# Patient Record
Sex: FEMALE | Race: WHITE | NOT HISPANIC OR LATINO | Employment: PART TIME | URBAN - METROPOLITAN AREA
[De-identification: names, ages, dates, MRNs, and addresses within clinical notes are randomized per-mention and may not be internally consistent; named-entity substitution may affect disease eponyms.]

---

## 2020-08-11 ENCOUNTER — COMMUNICATION - HEALTHEAST (OUTPATIENT)
Dept: SCHEDULING | Facility: CLINIC | Age: 40
End: 2020-08-11

## 2020-08-13 ENCOUNTER — COMMUNICATION - HEALTHEAST (OUTPATIENT)
Dept: CARE COORDINATION | Facility: CLINIC | Age: 40
End: 2020-08-13

## 2020-08-14 ENCOUNTER — COMMUNICATION - HEALTHEAST (OUTPATIENT)
Dept: CARE COORDINATION | Facility: CLINIC | Age: 40
End: 2020-08-14

## 2020-08-15 ENCOUNTER — COMMUNICATION - HEALTHEAST (OUTPATIENT)
Dept: SCHEDULING | Facility: CLINIC | Age: 40
End: 2020-08-15

## 2021-05-25 ENCOUNTER — RECORDS - HEALTHEAST (OUTPATIENT)
Dept: ADMINISTRATIVE | Facility: CLINIC | Age: 41
End: 2021-05-25

## 2021-06-01 ENCOUNTER — RECORDS - HEALTHEAST (OUTPATIENT)
Dept: ADMINISTRATIVE | Facility: CLINIC | Age: 41
End: 2021-06-01

## 2021-06-02 ENCOUNTER — RECORDS - HEALTHEAST (OUTPATIENT)
Dept: ADMINISTRATIVE | Facility: CLINIC | Age: 41
End: 2021-06-02

## 2021-06-10 NOTE — TELEPHONE ENCOUNTER
An alert was received via  Care Loop from pt regarding a change in status     Call back to pt attempted at 825am       Left message for her to call back and ask to speak with a Triage RN           Daniel Duran rn

## 2021-06-10 NOTE — TELEPHONE ENCOUNTER
"An alert was received via  Care Loop from pt regarding a change in status      Spoke with pt       Sounds tired and voice sounds a bit weak - some light coughing heard -- overall \"about the same\" as when she was seen at ED this am - was started on ABX at that time and given care directions for home care as well         Directed to call back as needed - She is ok with cont follow up via care loop and has tele# to reach triage RNs as well           Daniel Duran rn          "

## 2021-06-10 NOTE — TELEPHONE ENCOUNTER
"[facility] for dyspnea and cough.  Pt states she was feeling \"ok, sx more manageable\" last night and this morning, but this afternoon she lay down to watch TV and started not feeling well again; her breathing felt \"wheezy and raspy,\" she checked her O2 sats and they were down to 90-91%, which hasn't happened before.  States her O2 sats went up to 94-95% after sitting up straight and have stayed there since.  States her chest is hurting again, and it's hard to take a deep breath as the day's gone on; coughing more.  Talking too long causes her to get short of breath, but it wasn't like that this morning.  States she had her 3 kids tested for COVID-19 today, and her sister will be keeping her kids at her house for the next few days so she can rest.  Her fever has been down, today 98.2, but she still has the bodyaches and chills, even when her temp is lower.      RN encouraged pt to be seen in the ER for worsening chest pain, shortness of breath, but prefers to self-monitor for now but will consider going to the ER if her O2 sats drop again or her CP or shortness of breath continue to worsen.  Pt stated main concern is she has no insurance.  RN informed pt of [facility] to help with financial assistance, if she needs to be seen in the ER.  Pt will think about it.    Reason for Disposition    MILD difficulty breathing (e.g., minimal/no SOB at rest, SOB with walking, pulse <100)    Chest pain or pressure    Additional Information    Negative: SEVERE difficulty breathing (e.g., struggling for each breath, speaks in single words)    Negative: Difficult to awaken or acting confused (e.g., disoriented, slurred speech)    Negative: Bluish (or gray) lips or face now    Negative: Shock suspected (e.g., cold/pale/clammy skin, too weak to stand, low BP, rapid pulse)    Negative: Sounds like a life-threatening emergency to the triager    Negative: [1] COVID-19 exposure AND [2] no symptoms    Negative: COVID-19 and " Breastfeeding, questions about    Negative: [1] Adult with possible COVID-19 symptoms AND [2] triager concerned about severity of symptoms or other causes    Negative: SEVERE or constant chest pain or pressure (Exception: mild central chest pain, present only when coughing)    Negative: MODERATE difficulty breathing (e.g., speaks in phrases, SOB even at rest, pulse 100-120)    Negative: Patient sounds very sick or weak to the triager    Protocols used: CORONAVIRUS (COVID-19) DIAGNOSED OR [id number] 5.16.20    Angela Manley RN Clinic Care Coordinator

## 2021-06-10 NOTE — TELEPHONE ENCOUNTER
An alert was received via  Care Loop from pt regarding a change in status      2nd call back attempted to pt  at 1150am         Left message for her to call back and ask to speak with a Triage RN           Daniel Duran rn

## 2021-06-10 NOTE — TELEPHONE ENCOUNTER
"Pt on [facility] red flag alert reporting shortness of breath, N/V, diarrhea, shaking chills, dizziness and cough. Call made to assess. Pt was seen at  ED 8/9, COVID-19 negative.    Pt's responses are slow, speech slightly slurred, voice soft. Pt reports feeling very ill. States she sometimes feels \"loopy\".    O2 sats 94%. Temp 100.8     Pt tries to drink water. She'll have a coughing spell, then vomits. Stools are watery, tan color, no blood. Pt doesn't know what color her urine is.    Pt states she doesn't have a RLL, congenital defect.    Pt states ED told her she likely has a false negative COVID test.    Instructed pt to go to ED but she declines because she has no health insurance. It is too expensive through her work.  Instructed pt to sip fluids every 5 minutes until urine pale yellow.  Pt has 3 children under 14 without supervision. She is quasi-self isolating. Pt is . No one can take children away because they have been exposed and not tested.  Instructed pt to call her [facility] for further direction.   This RN called her clinic, as well, in case pt doesn't follow through.  Instructed pt to check in daily with [facility]. Pt agreed.  "

## 2021-06-26 ENCOUNTER — HEALTH MAINTENANCE LETTER (OUTPATIENT)
Age: 41
End: 2021-06-26

## 2021-10-16 ENCOUNTER — HEALTH MAINTENANCE LETTER (OUTPATIENT)
Age: 41
End: 2021-10-16

## 2022-07-17 ENCOUNTER — HEALTH MAINTENANCE LETTER (OUTPATIENT)
Age: 42
End: 2022-07-17

## 2022-09-25 ENCOUNTER — HEALTH MAINTENANCE LETTER (OUTPATIENT)
Age: 42
End: 2022-09-25

## 2022-12-20 ENCOUNTER — HOSPITAL ENCOUNTER (EMERGENCY)
Facility: CLINIC | Age: 42
Discharge: HOME | End: 2022-12-20
Attending: EMERGENCY MEDICINE | Admitting: EMERGENCY MEDICINE
Payer: COMMERCIAL

## 2022-12-20 ENCOUNTER — APPOINTMENT (OUTPATIENT)
Dept: RADIOLOGY | Facility: CLINIC | Age: 42
End: 2022-12-20
Attending: EMERGENCY MEDICINE
Payer: COMMERCIAL

## 2022-12-20 VITALS
TEMPERATURE: 98.1 F | SYSTOLIC BLOOD PRESSURE: 135 MMHG | BODY MASS INDEX: 27.49 KG/M2 | OXYGEN SATURATION: 95 % | DIASTOLIC BLOOD PRESSURE: 63 MMHG | RESPIRATION RATE: 16 BRPM | HEIGHT: 65 IN | HEART RATE: 72 BPM | WEIGHT: 165 LBS

## 2022-12-20 DIAGNOSIS — S20.211A CONTUSION OF RIGHT CHEST WALL, INITIAL ENCOUNTER: ICD-10-CM
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
## 2022-12-20 LAB
ALBUMIN SERPL-MCNC: 4.1 G/DL (ref 3.5–5)
ALBUMIN UR-MCNC: NEGATIVE MG/DL
ALP SERPL-CCNC: 71 U/L (ref 45–120)
ALT SERPL W P-5'-P-CCNC: 17 U/L (ref 0–45)
ANION GAP SERPL CALCULATED.3IONS-SCNC: 7 MMOL/L (ref 5–18)
APPEARANCE UR: CLEAR
AST SERPL W P-5'-P-CCNC: 27 U/L (ref 0–40)
BILIRUB SERPL-MCNC: 0.4 MG/DL (ref 0–1)
BILIRUB UR QL STRIP: NEGATIVE
BUN SERPL-MCNC: 11 MG/DL (ref 8–22)
CALCIUM SERPL-MCNC: 9.6 MG/DL (ref 8.5–10.5)
CHLORIDE BLD-SCNC: 103 MMOL/L (ref 98–107)
CO2 SERPL-SCNC: 26 MMOL/L (ref 22–31)
COLOR UR AUTO: ABNORMAL
CREAT SERPL-MCNC: 0.75 MG/DL (ref 0.6–1.1)
ERYTHROCYTE [DISTWIDTH] IN BLOOD BY AUTOMATED COUNT: 12.9 % (ref 10–15)
GFR SERPL CREATININE-BSD FRML MDRD: >90 ML/MIN/1.73M2
GLUCOSE BLD-MCNC: 94 MG/DL (ref 70–125)
GLUCOSE UR STRIP-MCNC: NEGATIVE MG/DL
HCT VFR BLD AUTO: 40.8 % (ref 35–47)
HGB BLD-MCNC: 13.4 G/DL (ref 11.7–15.7)
HGB UR QL STRIP: NEGATIVE
KETONES UR STRIP-MCNC: NEGATIVE MG/DL
LEUKOCYTE ESTERASE UR QL STRIP: NEGATIVE
MCH RBC QN AUTO: 29 PG (ref 26.5–33)
MCHC RBC AUTO-ENTMCNC: 32.8 G/DL (ref 31.5–36.5)
MCV RBC AUTO: 88 FL (ref 78–100)
MUCOUS THREADS #/AREA URNS LPF: PRESENT /LPF
NITRATE UR QL: NEGATIVE
PH UR STRIP: 6 [PH] (ref 5–7)
PLATELET # BLD AUTO: 319 10E3/UL (ref 150–450)
POTASSIUM BLD-SCNC: 3.7 MMOL/L (ref 3.5–5)
PROT SERPL-MCNC: 7.8 G/DL (ref 6–8)
RBC # BLD AUTO: 4.62 10E6/UL (ref 3.8–5.2)
RBC URINE: 1 /HPF
SODIUM SERPL-SCNC: 136 MMOL/L (ref 136–145)
SP GR UR STRIP: 1.01 (ref 1–1.03)
SQUAMOUS EPITHELIAL: <1 /HPF
UROBILINOGEN UR STRIP-MCNC: <2 MG/DL
WBC # BLD AUTO: 8.2 10E3/UL (ref 4–11)
WBC URINE: 1 /HPF

## 2022-12-20 PROCEDURE — 36415 COLL VENOUS BLD VENIPUNCTURE: CPT | Performed by: EMERGENCY MEDICINE

## 2022-12-20 PROCEDURE — 80053 COMPREHEN METABOLIC PANEL: CPT | Performed by: EMERGENCY MEDICINE

## 2022-12-20 PROCEDURE — 250N000013 HC RX MED GY IP 250 OP 250 PS 637: Performed by: EMERGENCY MEDICINE

## 2022-12-20 PROCEDURE — 99284 EMERGENCY DEPT VISIT MOD MDM: CPT

## 2022-12-20 PROCEDURE — 71101 X-RAY EXAM UNILAT RIBS/CHEST: CPT | Mod: RT

## 2022-12-20 PROCEDURE — 85027 COMPLETE CBC AUTOMATED: CPT | Performed by: EMERGENCY MEDICINE

## 2022-12-20 PROCEDURE — 81001 URINALYSIS AUTO W/SCOPE: CPT | Performed by: EMERGENCY MEDICINE

## 2022-12-20 RX ORDER — HYDROCODONE BITARTRATE AND ACETAMINOPHEN 5; 325 MG/1; MG/1
1 TABLET ORAL ONCE
Status: COMPLETED | OUTPATIENT
Start: 2022-12-20 | End: 2022-12-20

## 2022-12-20 RX ORDER — LIDOCAINE 4 G/G
1 PATCH TOPICAL ONCE
Status: DISCONTINUED | OUTPATIENT
Start: 2022-12-20 | End: 2022-12-20 | Stop reason: HOSPADM

## 2022-12-20 RX ADMIN — HYDROCODONE BITARTRATE AND ACETAMINOPHEN 1 TABLET: 5; 325 TABLET ORAL at 15:07

## 2022-12-20 RX ADMIN — LIDOCAINE 1 PATCH: 246 PATCH TOPICAL at 16:14

## 2022-12-20 NOTE — DISCHARGE INSTRUCTIONS
Fortunately your x-rays today did not show any signs of fracture.  You may try using over-the-counter Lidoderm patches to help with your pain.  You may also use Motrin or Tylenol.  Follow-up with your primary care doctor in the next several days and return to the ER for any worsening symptoms or other concerns
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
## 2023-08-05 ENCOUNTER — HEALTH MAINTENANCE LETTER (OUTPATIENT)
Age: 43
End: 2023-08-05

## 2023-11-04 ENCOUNTER — HOSPITAL ENCOUNTER (EMERGENCY)
Facility: CLINIC | Age: 43
Discharge: HOME | End: 2023-11-04
Attending: EMERGENCY MEDICINE | Admitting: EMERGENCY MEDICINE
Payer: COMMERCIAL

## 2023-11-04 ENCOUNTER — APPOINTMENT (OUTPATIENT)
Dept: ULTRASOUND IMAGING | Facility: CLINIC | Age: 43
End: 2023-11-04
Attending: EMERGENCY MEDICINE
Payer: COMMERCIAL

## 2023-11-04 ENCOUNTER — APPOINTMENT (OUTPATIENT)
Dept: CT IMAGING | Facility: CLINIC | Age: 43
End: 2023-11-04
Attending: EMERGENCY MEDICINE
Payer: COMMERCIAL

## 2023-11-04 VITALS
HEART RATE: 70 BPM | DIASTOLIC BLOOD PRESSURE: 60 MMHG | RESPIRATION RATE: 24 BRPM | OXYGEN SATURATION: 96 % | SYSTOLIC BLOOD PRESSURE: 116 MMHG

## 2023-11-04 DIAGNOSIS — R07.89 CHEST WALL PAIN: ICD-10-CM

## 2023-11-04 LAB
ATRIAL RATE - MUSE: 70 BPM
DIASTOLIC BLOOD PRESSURE - MUSE: NORMAL MMHG
HCG UR QL: NEGATIVE
HOLD SPECIMEN: NORMAL
HOLD SPECIMEN: NORMAL
INTERPRETATION ECG - MUSE: NORMAL
P AXIS - MUSE: 63 DEGREES
PR INTERVAL - MUSE: 156 MS
QRS DURATION - MUSE: 92 MS
QT - MUSE: 424 MS
QTC - MUSE: 457 MS
R AXIS - MUSE: 66 DEGREES
SYSTOLIC BLOOD PRESSURE - MUSE: NORMAL MMHG
T AXIS - MUSE: 45 DEGREES
TROPONIN T SERPL HS-MCNC: <6 NG/L
VENTRICULAR RATE- MUSE: 70 BPM

## 2023-11-04 PROCEDURE — 99285 EMERGENCY DEPT VISIT HI MDM: CPT | Mod: 25

## 2023-11-04 PROCEDURE — 250N000011 HC RX IP 250 OP 636: Performed by: EMERGENCY MEDICINE

## 2023-11-04 PROCEDURE — 36415 COLL VENOUS BLD VENIPUNCTURE: CPT | Performed by: EMERGENCY MEDICINE

## 2023-11-04 PROCEDURE — 93971 EXTREMITY STUDY: CPT | Mod: RT

## 2023-11-04 PROCEDURE — 81025 URINE PREGNANCY TEST: CPT | Performed by: EMERGENCY MEDICINE

## 2023-11-04 PROCEDURE — 84484 ASSAY OF TROPONIN QUANT: CPT | Performed by: EMERGENCY MEDICINE

## 2023-11-04 PROCEDURE — 250N000013 HC RX MED GY IP 250 OP 250 PS 637: Performed by: EMERGENCY MEDICINE

## 2023-11-04 PROCEDURE — 71275 CT ANGIOGRAPHY CHEST: CPT

## 2023-11-04 PROCEDURE — 93005 ELECTROCARDIOGRAM TRACING: CPT | Performed by: EMERGENCY MEDICINE

## 2023-11-04 RX ORDER — IOPAMIDOL 755 MG/ML
100 INJECTION, SOLUTION INTRAVASCULAR ONCE
Status: COMPLETED | OUTPATIENT
Start: 2023-11-04 | End: 2023-11-04

## 2023-11-04 RX ORDER — ACETAMINOPHEN 325 MG/1
650 TABLET ORAL ONCE
Status: COMPLETED | OUTPATIENT
Start: 2023-11-04 | End: 2023-11-04

## 2023-11-04 RX ADMIN — IOPAMIDOL 100 ML: 755 INJECTION, SOLUTION INTRAVENOUS at 20:55

## 2023-11-04 RX ADMIN — ACETAMINOPHEN 650 MG: 325 TABLET ORAL at 20:43

## 2023-11-04 ASSESSMENT — ACTIVITIES OF DAILY LIVING (ADL)
ADLS_ACUITY_SCORE: 35
ADLS_ACUITY_SCORE: 35

## 2023-11-05 NOTE — ED TRIAGE NOTES
Pt arrives from [facility] for 2-3 days history of right sided chest pain along with a cough, sore throat and fatigue.  Pt had a negative x-ray and EKG at the clinic.

## 2023-11-05 NOTE — ED PROVIDER NOTES
EMERGENCY DEPARTMENT ENCOUNTER            IMPRESSION:  Chest wall pain  Lymphedema        MEDICAL DECISION MAKING:  It was my pleasure to provide care for Radha Mcguire who presented for evaluation of chest pain.  She was seen in urgent care today and referred here for further evaluation.  Her symptoms are atypical for acute coronary syndrome pulmonary embolism.  Outpatient work-up prior to arrival included negative chest x-ray CBC and chemistry    On my exam patient is pleasant and cooperative.   Vital signs are normal.  Physical exam notable for right parasternal chest wall tenderness.  She has right lower extremity baseline lymphedema.     Pain medic administered for symptom relief.  Patient's symptoms improved.     EKG independently interpreted does not show evidence of arrhythmia or ischemia    Laboratory investigation independently interpreted and notable for negative pregnancy and troponin    CT imaging of the chest does not show pulmonary embolism or pneumonia.  Imaging independently interpreted and does not show pulmonary embolus.  No DVT right lower EXTR    ED evaluation is consistent with chest wall pain possibly from cough and bronchitis.     Patient was reevaluated and results were discussed.  I recommended discharge home outpatient over-the-counter medication      Prior to making a final disposition on this patient the results of patient's tests and other diagnostic studies were discussed with the patient. All questions were answered. Patient expressed understanding of the plan and was amenable.    Return precautions and follow-up were discussed.     =================================================================  CHIEF COMPLAINT:  Chief Complaint   Patient presents with    Chest Pain         HPI  Radha Mcguire is a 42 year old female with a history of cancer, cutaneous malignant melanoma, asthma, polyp of sigmoid colon, who presents to the ED by walk-in for evaluation of chest pain.    Per chart  "review, the patient presented to [facility] for evaluation of chest pain on 11/4/2023. Chest x-ray was done, results noted bibasilar linear scar unchanged, no focal consolidation, pneumothorax nor pleural effusion. Strep test was negative. Recommended further work-up tonight in the ER to rule out pulmonary embolism.    Patient went to clinic today due to ongoing chest pain but they were unable to rule out pulmonary embolism, so they advised her to come to ER.   She has gregory having chest pain localized in the right side of her chest for a week now, noting she has been coughing and shortness of breath as well. She had COVID with pneumonia in the past, and notes she has \"never got back to how she was before\". Notes she got sick in September. Mentions her right lower leg is swollen, and is concerned for blood clots in right lower extremity and possible blood clots in lungs. No other reported complaints or concerns at this time.    REVIEW OF SYSTEMS  Constitutional: Does not report chills, unintentional weight loss or fatigue   Eyes: Does not report visual changes or discharge    HENT: Does not report sore throat, ear pain or neck pain  Respiratory: Positive for cough and shortness of breath.  Cardiovascular: Does not report palpitations or leg swelling. Positive for right-sided chest pain. Positive for right lower leg swelling.  GI: Does not report abdominal pain, nausea, vomiting, or dark, bloody stools.    : Does not report hematuria, dysuria, or flank pain  Musculoskeletal: Does not report any new musculoskeletal pain or new muscle/joint pains  Skin: Does not report rash or wound  Neurologic: Does not report current headache, new weakness, focal weakness, or sensory changes        Remainder of systems reviewed, unless noted in HPI all others negative.      PAST MEDICAL HISTORY:  Past Medical History:   Diagnosis Date    Asthma, mild intermittent 11/30/2009    Cancer (H)        PAST SURGICAL " "HISTORY:  Past Surgical History:   Procedure Laterality Date    APPENDECTOMY      LUNG LOBECTOMY      right lower lobe         CURRENT MEDICATIONS:    albuterol (PROAIR HFA;PROVENTIL HFA;VENTOLIN HFA) 90 mcg/actuation inhaler  beclomethasone (QVAR) 40 mcg/actuation inhaler  benzonatate (TESSALON) 100 MG capsule  ibuprofen (ADVIL,MOTRIN) 200 MG tablet  ipratropium-albuterol (DUO-NEB) 0.5-2.5 mg/mL nebulizer  predniSONE (DELTASONE) 20 MG tablet        ALLERGIES:  Allergies   Allergen Reactions    Amoxicillin-Pot Clavulanate Hives    Amoxicillin-Pot Clavulanate Hives and Rash    Tequin [Gatifloxacin] Unknown     Tongue swelling    Adhesive [Cyanoacrylate] Unknown    Aspirin Unknown     Other reaction(s): Stomach Upset, Stomach upset from asa    Contrast [Iodixanol] Unknown     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   Pt injected with contrast for ct scan in 2015 and 2017 with no reactions or premedication. 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    SOCIAL HISTORY:   Social History     Socioeconomic History    Marital status:    Tobacco Use    Smoking status: Never   Substance and Sexual Activity    " Alcohol use: No       PHYSICAL EXAM:    /55   Pulse 69   Resp 22   SpO2 96%     Constitutional: Awake, alert, no  Head: Normocephalic, atraumatic.  ENT: Mucous membranes are moist.  No pallor.   Eyes: Pupils are reactive.  No discoloration.  Neck: No lymphadenopathy, no stridor, supple, no soft tissue swelling  Chest: Right parasternal chest wall tenderness that duplicates her pain  Respiratory: Respirations even, unlabored. Lungs clear to ascultation bilaterally, in no acute respiratory distress.  Cardiovascular: Regular rate and rhythm.  Good overall perfusion.  Upper and lower extremity pulses are equal.  GI: Abdomen soft, non-tender to palpation.  No guarding or rebound. Bowel sounds present throughout.   Back: No CVA tenderness.    Musculoskeletal: Right lower extremity baseline lymphedema  Integument: Warm, dry. No rash. No bruising or petechiae.  Neurologic: Alert & oriented x 3.   Psychiatric: Normal mood and affect.  Appropriate judgement.    ED COURSE:  7:31 PM Met with the patient and performed my initial exam.        Medical Decision Making    History:  Supplemental history from: Urgent care referring provider  External Record(s) reviewed: External medical records including care everywhere reviewed: Clinic visit to [facility] for chest pain on 11/4/2023.    Work Up:  EKG, laboratory and imaging studies as ordered were independently interpreted by myself.   Broad differential diagnosis considered for chest  The patient's presentation was of high complexity.     Complicating factors:  Patient has a complicated past medical history including: Cancer, cutaneous malignant melanoma, and asthma.  Care affected by social determinants of health: Access to primary care    Disposition involved shared decision-making with the patient.     Patient otherwise to continue outpatient medications as prescribed.       LAB:  Laboratory results were independently reviewed and interpreted  Results  for orders placed or performed during the hospital encounter of 11/04/23   US Lower Extremity Venous Duplex Right    Impression    IMPRESSION:  1.  No deep venous thrombosis in the right lower extremity.   CT Chest Pulmonary Embolism w Contrast    Impression    IMPRESSION:  1.  No pulmonary emboli.  2.  Mosaic attenuation of lung parenchyma most suggestive of small airways disease and there mild bronchial wall thickening diffusely.   Result Value Ref Range    Troponin T, High Sensitivity <6 <=14 ng/L   HCG qualitative urine   Result Value Ref Range    hCG Urine Qualitative Negative Negative   Extra Blue Top Tube   Result Value Ref Range    Hold Specimen JIC    Extra Purple Top Tube   Result Value Ref Range    Hold Specimen JIC    ECG 12-LEAD WITH MUSE (LHE)   Result Value Ref Range    Systolic Blood Pressure  mmHg    Diastolic Blood Pressure  mmHg    Ventricular Rate 70 BPM    Atrial Rate 70 BPM    AK Interval 156 ms    QRS Duration 92 ms     ms    QTc 457 ms    P Axis 63 degrees    R AXIS 66 degrees    T Axis 45 degrees    Interpretation ECG       Sinus rhythm  Septal infarct (cited on or before 15-AUG-2020)  Abnormal ECG  When compared with ECG of 15-AUG-2020 02:17,  No significant change was found  Confirmed by SEE ED PROVIDER NOTE FOR, ECG INTERPRETATION ([id number]),  Mily Strong ([id number]) on 11/4/2023 8:02:29 PM           RADIOLOGY:  Radiology reports were independently reviewed and interpreted  US Lower Extremity Venous Duplex Right   Final Result   IMPRESSION:   1.  No deep venous thrombosis in the right lower extremity.      CT Chest Pulmonary Embolism w Contrast   Final Result   IMPRESSION:   1.  No pulmonary emboli.   2.  Mosaic attenuation of lung parenchyma most suggestive of small airways disease and there mild bronchial wall thickening diffusely.           EKG:    ECG results from 11/04/23   ECG 12-LEAD WITH MUSE (LHE)     Value    Systolic Blood Pressure     Diastolic Blood Pressure      Ventricular Rate 70    Atrial Rate 70    PA Interval 156    QRS Duration 92        QTc 457    P Axis 63    R AXIS 66    T Axis 45    Interpretation ECG      Sinus rhythm  Septal infarct (cited on or before 15-AUG-2020)  Abnormal ECG  When compared with ECG of 15-AUG-2020 02:17,  No significant change was found  Confirmed by SEE ED PROVIDER NOTE FOR, ECG INTERPRETATION (4000),  Ligia Mily ([id number]) on 11/4/2023 8:02:29 PM             MEDICATIONS GIVEN IN THE EMERGENCY:  Medications   acetaminophen (TYLENOL) tablet 650 mg (650 mg Oral $Given 11/4/23 2043)   iopamidol (ISOVUE-370) solution 100 mL (100 mLs Intravenous $Given 11/4/23 2055)           NEW PRESCRIPTIONS STARTED AT TODAY'S ER VISIT:  New Prescriptions    No medications on file                FINAL DIAGNOSIS:    ICD-10-CM    1. Chest wall pain  R07.89                  NAME: Radha Mcguire  AGE: 42 year old female  YOB: 1980  MRN: [id number]  EVALUATION DATE & TIME: 11/4/2023  6:59 PM    PCP: Royer Gastelum    ED PROVIDER: ANGELLA Nugent Yasmin Hassan, am serving as a scribe to document services personally performed by Dr. Peter Ortiz based on my observation and the provider's statements to me. IPeter MD attest that Carla López is acting in a scribe capacity, has observed my performance of the services and has documented them in accordance with my direction.    Peter Ortiz M.D.  Emergency Medicine  [facility] EMERGENCY ROOM  [address] [address]  [phone]  Dept: [phone]  11/4/2023         Peter Ortiz MD  11/04/23 3348

## 2023-11-05 NOTE — DISCHARGE INSTRUCTIONS
No evidence of cardiac related chest  No evidence of blood clot in the lung  Right lower extremity negative for DVT  Your pain is consistent with musculoskeletal  I recommended 1000 mg Tylenol and 800 mg Motrin over-the-counter for pain  Okay to tolerate intake

## 2024-03-02 ENCOUNTER — HEALTH MAINTENANCE LETTER (OUTPATIENT)
Age: 44
End: 2024-03-02

## 2024-09-20 ENCOUNTER — APPOINTMENT (OUTPATIENT)
Dept: RADIOLOGY | Facility: CLINIC | Age: 44
End: 2024-09-20
Attending: EMERGENCY MEDICINE
Payer: COMMERCIAL

## 2024-09-20 ENCOUNTER — HOSPITAL ENCOUNTER (EMERGENCY)
Facility: CLINIC | Age: 44
Discharge: HOME | End: 2024-09-20
Attending: EMERGENCY MEDICINE | Admitting: EMERGENCY MEDICINE
Payer: COMMERCIAL

## 2024-09-20 VITALS
BODY MASS INDEX: 33.32 KG/M2 | DIASTOLIC BLOOD PRESSURE: 66 MMHG | SYSTOLIC BLOOD PRESSURE: 119 MMHG | TEMPERATURE: 98.1 F | WEIGHT: 200 LBS | OXYGEN SATURATION: 95 % | HEART RATE: 68 BPM | HEIGHT: 65 IN | RESPIRATION RATE: 20 BRPM

## 2024-09-20 DIAGNOSIS — R09.1 PLEURISY: ICD-10-CM

## 2024-09-20 LAB
ALBUMIN SERPL BCG-MCNC: 4.2 G/DL (ref 3.5–5.2)
ALBUMIN UR-MCNC: NEGATIVE MG/DL
ALP SERPL-CCNC: 77 U/L (ref 40–150)
ALT SERPL W P-5'-P-CCNC: 13 U/L (ref 0–50)
ANION GAP SERPL CALCULATED.3IONS-SCNC: 11 MMOL/L (ref 7–15)
APPEARANCE UR: CLEAR
AST SERPL W P-5'-P-CCNC: 19 U/L (ref 0–45)
BACTERIA #/AREA URNS HPF: ABNORMAL /HPF
BASOPHILS # BLD AUTO: 0 10E3/UL (ref 0–0.2)
BASOPHILS NFR BLD AUTO: 1 %
BILIRUB DIRECT SERPL-MCNC: <0.2 MG/DL (ref 0–0.3)
BILIRUB SERPL-MCNC: 0.3 MG/DL
BILIRUB UR QL STRIP: NEGATIVE
BUN SERPL-MCNC: 9.1 MG/DL (ref 6–20)
CALCIUM SERPL-MCNC: 9.7 MG/DL (ref 8.8–10.4)
CHLORIDE SERPL-SCNC: 102 MMOL/L (ref 98–107)
COLOR UR AUTO: COLORLESS
CREAT SERPL-MCNC: 0.77 MG/DL (ref 0.51–0.95)
EGFRCR SERPLBLD CKD-EPI 2021: >90 ML/MIN/1.73M2
EOSINOPHIL # BLD AUTO: 0.2 10E3/UL (ref 0–0.7)
EOSINOPHIL NFR BLD AUTO: 2 %
ERYTHROCYTE [DISTWIDTH] IN BLOOD BY AUTOMATED COUNT: 13.3 % (ref 10–15)
GLUCOSE SERPL-MCNC: 121 MG/DL (ref 70–99)
GLUCOSE UR STRIP-MCNC: NEGATIVE MG/DL
HCG SERPL QL: NEGATIVE
HCO3 SERPL-SCNC: 24 MMOL/L (ref 22–29)
HCT VFR BLD AUTO: 38 % (ref 35–47)
HGB BLD-MCNC: 12.6 G/DL (ref 11.7–15.7)
HGB UR QL STRIP: NEGATIVE
HOLD SPECIMEN: NORMAL
HOLD SPECIMEN: NORMAL
IMM GRANULOCYTES # BLD: 0 10E3/UL
IMM GRANULOCYTES NFR BLD: 0 %
KETONES UR STRIP-MCNC: NEGATIVE MG/DL
LEUKOCYTE ESTERASE UR QL STRIP: NEGATIVE
LIPASE SERPL-CCNC: 27 U/L (ref 13–60)
LYMPHOCYTES # BLD AUTO: 2 10E3/UL (ref 0.8–5.3)
LYMPHOCYTES NFR BLD AUTO: 28 %
MCH RBC QN AUTO: 28.8 PG (ref 26.5–33)
MCHC RBC AUTO-ENTMCNC: 33.2 G/DL (ref 31.5–36.5)
MCV RBC AUTO: 87 FL (ref 78–100)
MONOCYTES # BLD AUTO: 0.5 10E3/UL (ref 0–1.3)
MONOCYTES NFR BLD AUTO: 7 %
NEUTROPHILS # BLD AUTO: 4.4 10E3/UL (ref 1.6–8.3)
NEUTROPHILS NFR BLD AUTO: 62 %
NITRATE UR QL: NEGATIVE
NRBC # BLD AUTO: 0 10E3/UL
NRBC BLD AUTO-RTO: 0 /100
PH UR STRIP: 6 [PH] (ref 5–7)
PLATELET # BLD AUTO: 301 10E3/UL (ref 150–450)
POTASSIUM SERPL-SCNC: 3.5 MMOL/L (ref 3.4–5.3)
PROT SERPL-MCNC: 7.7 G/DL (ref 6.4–8.3)
RBC # BLD AUTO: 4.37 10E6/UL (ref 3.8–5.2)
RBC URINE: 1 /HPF
SODIUM SERPL-SCNC: 137 MMOL/L (ref 135–145)
SP GR UR STRIP: 1 (ref 1–1.03)
SQUAMOUS EPITHELIAL: <1 /HPF
TROPONIN T SERPL HS-MCNC: <6 NG/L
UROBILINOGEN UR STRIP-MCNC: <2 MG/DL
WBC # BLD AUTO: 7.1 10E3/UL (ref 4–11)
WBC URINE: 0 /HPF

## 2024-09-20 PROCEDURE — 82248 BILIRUBIN DIRECT: CPT | Performed by: EMERGENCY MEDICINE

## 2024-09-20 PROCEDURE — 36415 COLL VENOUS BLD VENIPUNCTURE: CPT | Performed by: EMERGENCY MEDICINE

## 2024-09-20 PROCEDURE — 99285 EMERGENCY DEPT VISIT HI MDM: CPT | Mod: 25

## 2024-09-20 PROCEDURE — 93005 ELECTROCARDIOGRAM TRACING: CPT | Performed by: EMERGENCY MEDICINE

## 2024-09-20 PROCEDURE — 84703 CHORIONIC GONADOTROPIN ASSAY: CPT | Performed by: EMERGENCY MEDICINE

## 2024-09-20 PROCEDURE — 84484 ASSAY OF TROPONIN QUANT: CPT | Performed by: EMERGENCY MEDICINE

## 2024-09-20 PROCEDURE — 71046 X-RAY EXAM CHEST 2 VIEWS: CPT

## 2024-09-20 PROCEDURE — 85025 COMPLETE CBC W/AUTO DIFF WBC: CPT | Performed by: EMERGENCY MEDICINE

## 2024-09-20 PROCEDURE — 81001 URINALYSIS AUTO W/SCOPE: CPT | Performed by: EMERGENCY MEDICINE

## 2024-09-20 PROCEDURE — 83690 ASSAY OF LIPASE: CPT | Performed by: EMERGENCY MEDICINE

## 2024-09-20 PROCEDURE — 82374 ASSAY BLOOD CARBON DIOXIDE: CPT | Performed by: EMERGENCY MEDICINE

## 2024-09-20 RX ORDER — IBUPROFEN 800 MG/1
800 TABLET, FILM COATED ORAL EVERY 8 HOURS PRN
Qty: 60 TABLET | Refills: 0 | Status: SHIPPED | OUTPATIENT
Start: 2024-09-20 | End: 2024-10-10

## 2024-09-20 ASSESSMENT — COLUMBIA-SUICIDE SEVERITY RATING SCALE - C-SSRS
2. HAVE YOU ACTUALLY HAD ANY THOUGHTS OF KILLING YOURSELF IN THE PAST MONTH?: NO
1. IN THE PAST MONTH, HAVE YOU WISHED YOU WERE DEAD OR WISHED YOU COULD GO TO SLEEP AND NOT WAKE UP?: NO
6. HAVE YOU EVER DONE ANYTHING, STARTED TO DO ANYTHING, OR PREPARED TO DO ANYTHING TO END YOUR LIFE?: NO

## 2024-09-20 ASSESSMENT — ACTIVITIES OF DAILY LIVING (ADL)
ADLS_ACUITY_SCORE: 33
ADLS_ACUITY_SCORE: 35

## 2024-09-20 NOTE — ED PROVIDER NOTES
EMERGENCY DEPARTMENT ENCOUNTER      NAME: Radha Mcguire  AGE: 43 year old female  YOB: 1980  MRN: [id number]  EVALUATION DATE & TIME: 9/20/2024  2:52 PM    PCP: Chelsy Milan    ED PROVIDER: Rhonda Wilson M.D.      Chief Complaint   Patient presents with    Chest Pain    Nausea         FINAL IMPRESSION:  1. Pleurisy          ED COURSE & MEDICAL DECISION MAKING:    ED Course as of 09/20/24 1918   Fri Sep 20, 2024   1527 Pt with hcg negative, chest pain one week with cough, troponin under 6 thus per high sensitivity troponin screenign protocol aCS is ruled out. EKG WNL, PERC negative therefore no PE, CBC and BMP WNL and no RUQ pain, normal LFT/lipase reassuringly, pending CXR, UA with negative hcg and will reassess with likely pleurisy.   1617 CXR independently interpreted by me with small fluid in fissure RLL to RML without obvious consolidation, pending radiology read       Pertinent Labs & Imaging studies reviewed. (See chart for details)    Medical Decision Making  Obtained supplemental history:Supplemental history obtained?: No  Reviewed external records: External records reviewed?: No  Care impacted by chronic illness:N/A  Care significantly affected by social determinants of health:N/A  Did you consider but not order tests?: Work up considered but not performed and documented in chart, if applicable  Did you interpret images independently?: Independent interpretation of ECG and images noted in documentation, when applicable.  Consultation discussion with other provider:Did you involve another provider (consultant, MH, pharmacy, etc.)?: No  Discharge. I prescribed additional prescription strength medication(s) as charted. I considered admission, but discharged patient after significant clinical improvement.        At the conclusion of the encounter I discussed the results of all of the tests and the disposition. The questions were answered. The patient or family acknowledged  understanding and was agreeable with the care plan.     MEDICATIONS GIVEN IN THE EMERGENCY:  Medications - No data to display    NEW PRESCRIPTIONS STARTED AT TODAY'S ER VISIT  Discharge Medication List as of 9/20/2024  4:27 PM        START taking these medications    Details   !! ibuprofen (ADVIL/MOTRIN) 800 MG tablet Take 1 tablet (800 mg) by mouth every 8 hours as needed for moderate pain., Disp-60 tablet, R-0, E-Prescribe       !! - Potential duplicate medications found. Please discuss with provider.             =================================================================    HPI      Radha Mcguire is a 43 year old female with PMHx of asthma and depression who presents to the ED today via walk-in with chest pain.     The patient presents with 1 week of constant diffuse chest discomfort that is worse with deep breaths. She says that this is moderately severe and radiates to her entire chest. She was recently placed on Paxlovid after a recent COVID diagnosis. She has not tried any pain medications yet for this. No history of clots. No estrogen supplements. She did have nausea and vomiting this morning but it resolved. Nonsmoker.     She denies cough, fever, leg swelling, abdominal pain, or any other complaints at this time.       REVIEW OF SYSTEMS   All other systems reviewed and are negative except as noted above in HPI.    PAST MEDICAL HISTORY:  Past Medical History:   Diagnosis Date    Asthma, mild intermittent 11/30/2009    Cancer (H)        PAST SURGICAL HISTORY:  Past Surgical History:   Procedure Laterality Date    APPENDECTOMY      LUNG LOBECTOMY      right lower lobe       CURRENT MEDICATIONS:    ibuprofen (ADVIL/MOTRIN) 800 MG tablet  albuterol (PROAIR HFA;PROVENTIL HFA;VENTOLIN HFA) 90 mcg/actuation inhaler  beclomethasone (QVAR) 40 mcg/actuation inhaler  benzonatate (TESSALON) 100 MG capsule  ibuprofen (ADVIL,MOTRIN) 200 MG tablet  ipratropium-albuterol (DUO-NEB) 0.5-2.5 mg/mL nebulizer  predniSONE  "(DELTASONE) 20 MG tablet        ALLERGIES:  Allergies   Allergen Reactions    Amoxicillin-Pot Clavulanate Hives    Amoxicillin-Pot Clavulanate Hives and Rash    Tequin [Gatifloxacin] Unknown     Tongue swelling    Adhesive [Cyanoacrylate] Unknown    Aspirin Unknown     Other reaction(s): Stomach Upset, Stomach upset from asa    Contrast [Iodixanol] Unknown     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   Pt injected with contrast for ct scan in 2015 and 2017 with no reactions or premedication. 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    SOCIAL HISTORY:   Social History     Socioeconomic History    Marital status: Single   Tobacco Use    Smoking status: Never   Substance and Sexual Activity    Alcohol use: No     Social Determinants of Health      Received from [employer]    Financial Resource Strain    Received from [employer] [employer]    Social Connections       VITALS:  Patient Vitals for the past 24 hrs:   BP Temp Temp src Pulse Resp SpO2 Height Weight   09/20/24 " "1530 119/66 -- -- 68 -- 95 % -- --   09/20/24 1500 132/72 -- -- 74 -- 97 % -- --   09/20/24 1432 -- 98.1  F (36.7  C) Oral -- -- -- -- --   09/20/24 1424 (!) 179/84 -- -- 78 20 97 % 1.651 m (5' 5\") 90.7 kg (200 lb)       PHYSICAL EXAM    GENERAL: Awake, alert.  In no acute distress.   HEENT: Normocephalic, atraumatic.  Pupils equal, round and reactive.  Conjunctiva normal.  EOMI.  NECK: No stridor or apparent deformity.  PULMONARY: Symmetrical breath sounds without distress.  Lungs clear to auscultation bilaterally without wheezes, rhonchi or rales.  CARDIO: Regular rate and rhythm.  No significant murmur, rub or gallop.  Radial pulses strong and symmetrical.  ABDOMINAL: Abdomen soft, non-distended and non-tender to palpation.  No CVAT, no palpable hepatosplenomegaly.  EXTREMITIES: No lower extremity swelling or edema.    NEURO: Alert and oriented to person, place and time.  Cranial nerves grossly intact.  No focal motor deficit.  PSYCH: Normal mood and affect  SKIN: No rashes      LAB:  All pertinent labs reviewed and interpreted.  Results for orders placed or performed during the hospital encounter of 09/20/24   XR Chest 2 Views    Impression    IMPRESSION: Trace bibasilar pleural thickening and chronic linear atelectasis, unchanged. Wedge resection suture line right lower lung. Lungs otherwise clear. Heart size and pulmonary vascularity within normal limits.     Basic metabolic panel   Result Value Ref Range    Sodium 137 135 - 145 mmol/L    Potassium 3.5 3.4 - 5.3 mmol/L    Chloride 102 98 - 107 mmol/L    Carbon Dioxide (CO2) 24 22 - 29 mmol/L    Anion Gap 11 7 - 15 mmol/L    Urea Nitrogen 9.1 6.0 - 20.0 mg/dL    Creatinine 0.77 0.51 - 0.95 mg/dL    GFR Estimate >90 >60 mL/min/1.73m2    Calcium 9.7 8.8 - 10.4 mg/dL    Glucose 121 (H) 70 - 99 mg/dL   Result Value Ref Range    Troponin T, High Sensitivity <6 <=14 ng/L   CBC with platelets and differential   Result Value Ref Range    WBC Count 7.1 4.0 - 11.0 " 10e3/uL    RBC Count 4.37 3.80 - 5.20 10e6/uL    Hemoglobin 12.6 11.7 - 15.7 g/dL    Hematocrit 38.0 35.0 - 47.0 %    MCV 87 78 - 100 fL    MCH 28.8 26.5 - 33.0 pg    MCHC 33.2 31.5 - 36.5 g/dL    RDW 13.3 10.0 - 15.0 %    Platelet Count 301 150 - 450 10e3/uL    % Neutrophils 62 %    % Lymphocytes 28 %    % Monocytes 7 %    % Eosinophils 2 %    % Basophils 1 %    % Immature Granulocytes 0 %    NRBCs per 100 WBC 0 <1 /100    Absolute Neutrophils 4.4 1.6 - 8.3 10e3/uL    Absolute Lymphocytes 2.0 0.8 - 5.3 10e3/uL    Absolute Monocytes 0.5 0.0 - 1.3 10e3/uL    Absolute Eosinophils 0.2 0.0 - 0.7 10e3/uL    Absolute Basophils 0.0 0.0 - 0.2 10e3/uL    Absolute Immature Granulocytes 0.0 <=0.4 10e3/uL    Absolute NRBCs 0.0 10e3/uL   Extra Blue Top Tube   Result Value Ref Range    Hold Specimen JIC    Extra Red Top Tube   Result Value Ref Range    Hold Specimen     Result Value Ref Range    Lipase 27 13 - 60 U/L   Hepatic function panel   Result Value Ref Range    Protein Total 7.7 6.4 - 8.3 g/dL    Albumin 4.2 3.5 - 5.2 g/dL    Bilirubin Total 0.3 <=1.2 mg/dL    Alkaline Phosphatase 77 40 - 150 U/L    AST 19 0 - 45 U/L    ALT 13 0 - 50 U/L    Bilirubin Direct <0.20 0.00 - 0.30 mg/dL   HCG qualitative Blood   Result Value Ref Range    hCG Serum Qualitative Negative Negative   UA with Microscopic reflex to Culture    Specimen: Urine, Midstream   Result Value Ref Range    Color Urine Colorless Colorless, Straw, Light Yellow, Yellow    Appearance Urine Clear Clear    Glucose Urine Negative Negative mg/dL    Bilirubin Urine Negative Negative    Ketones Urine Negative Negative mg/dL    Specific Gravity Urine 1.005 1.001 - 1.030    Blood Urine Negative Negative    pH Urine 6.0 5.0 - 7.0    Protein Albumin Urine Negative Negative mg/dL    Urobilinogen Urine <2.0 <2.0 mg/dL    Nitrite Urine Negative Negative    Leukocyte Esterase Urine Negative Negative    Bacteria Urine Few (A) None Seen /HPF    RBC Urine 1 <=2 /HPF    WBC Urine 0  <=5 /HPF    Squamous Epithelials Urine <1 <=1 /HPF       RADIOLOGY:  Reviewed all pertinent imaging. Please see official radiology report.  XR Chest 2 Views   Final Result   IMPRESSION: Trace bibasilar pleural thickening and chronic linear atelectasis, unchanged. Wedge resection suture line right lower lung. Lungs otherwise clear. Heart size and pulmonary vascularity within normal limits.               EKG:    Reviewed and interpreted as: September 20, 2024, 2:27 PM.   74 BPM  Sinus rhythm  When compared with ECG from 4-NOV-2023, no significant change was found.     I have independently reviewed and interpreted the EKG(s) documented above.        I, Brad Mondragon, am serving as a scribe to document services personally performed by Dr. Rhonda Wilson based on my observation and the provider's statements to me. I, Rhonda Wilson MD attest that Brad Mondragon is acting in a scribe capacity, has observed my performance of the services and has documented them in accordance with my direction.       Rhonda Wilson MD  09/20/24 1918

## 2024-09-20 NOTE — ED TRIAGE NOTES
Pt with COVID on Saturday and feeling mostly better yesterday c/o waking up this morning with chest and abdominal pain, SOB, nausea, and fatigue and vomiting. RN line recommended going to ER for eval. Pt alert.   Triage Assessment (Adult)       Row Name 09/20/24 1426          Triage Assessment    Airway WDL WDL        Respiratory WDL    Respiratory WDL X;rhythm/pattern;cough     Rhythm/Pattern, Respiratory shortness of breath     Cough Frequency frequent     Cough Type bronchospastic;nonproductive        Skin Circulation/Temperature WDL    Skin Circulation/Temperature WDL WDL        Cardiac WDL    Cardiac WDL X;chest pain        Chest Pain Assessment    Chest Pain Location midsternal;anterior chest, left     Chest Pain Radiation abdomen     Character aching;sharp     Duration this AM     Precipitating Factors activity     Alleviating Factors rest     Associated Signs/Symptoms nausea;dyspnea;dizziness     Chest Pain Intervention cardiac biomarkers drawn        Peripheral/Neurovascular WDL    Peripheral Neurovascular WDL WDL        Cognitive/Neuro/Behavioral WDL    Cognitive/Neuro/Behavioral WDL WDL        Debbie Coma Scale    Best Eye Response 4-->(E4) spontaneous     Best Motor Response 6-->(M6) obeys commands     Best Verbal Response 5-->(V5) oriented     Debbie Coma Scale Score 15

## 2024-09-20 NOTE — Clinical Note
Radha Mcguire was seen and treated in our emergency department on 9/20/2024.  She may return to work on 09/23/2024.       If you have any questions or concerns, please don't hesitate to call.      Rhonda Wilson MD

## 2024-09-23 LAB
ATRIAL RATE - MUSE: 74 BPM
DIASTOLIC BLOOD PRESSURE - MUSE: 84 MMHG
INTERPRETATION ECG - MUSE: NORMAL
P AXIS - MUSE: 69 DEGREES
PR INTERVAL - MUSE: 152 MS
QRS DURATION - MUSE: 90 MS
QT - MUSE: 406 MS
QTC - MUSE: 450 MS
R AXIS - MUSE: 29 DEGREES
SYSTOLIC BLOOD PRESSURE - MUSE: 179 MMHG
T AXIS - MUSE: 44 DEGREES
VENTRICULAR RATE- MUSE: 74 BPM

## 2024-09-28 ENCOUNTER — HEALTH MAINTENANCE LETTER (OUTPATIENT)
Age: 44
End: 2024-09-28